# Patient Record
Sex: MALE | Race: WHITE | URBAN - METROPOLITAN AREA
[De-identification: names, ages, dates, MRNs, and addresses within clinical notes are randomized per-mention and may not be internally consistent; named-entity substitution may affect disease eponyms.]

---

## 2021-12-01 ENCOUNTER — OUT OF OFFICE VISIT (OUTPATIENT)
Dept: URBAN - METROPOLITAN AREA MEDICAL CENTER 33 | Facility: MEDICAL CENTER | Age: 71
End: 2021-12-01
Payer: COMMERCIAL

## 2021-12-01 DIAGNOSIS — D64.89 ANEMIA DUE TO OTHER CAUSE: ICD-10-CM

## 2021-12-01 DIAGNOSIS — Z79.02 ANTIPLATELET OR ANTITHROMBOTIC LONG-TERM USE: ICD-10-CM

## 2021-12-01 DIAGNOSIS — R13.12 DYSPHAGIA: ICD-10-CM

## 2021-12-01 PROCEDURE — 99222 1ST HOSP IP/OBS MODERATE 55: CPT | Performed by: INTERNAL MEDICINE

## 2021-12-01 PROCEDURE — G8427 DOCREV CUR MEDS BY ELIG CLIN: HCPCS | Performed by: INTERNAL MEDICINE

## 2021-12-02 ENCOUNTER — TELEPHONE ENCOUNTER (OUTPATIENT)
Dept: URBAN - METROPOLITAN AREA CLINIC 105 | Facility: CLINIC | Age: 71
End: 2021-12-02

## 2021-12-07 ENCOUNTER — OFFICE VISIT (OUTPATIENT)
Dept: URBAN - METROPOLITAN AREA MEDICAL CENTER 33 | Facility: MEDICAL CENTER | Age: 71
End: 2021-12-07
Payer: COMMERCIAL

## 2021-12-07 DIAGNOSIS — R63.39 FEEDING DIFFICULTY IN CHILD: ICD-10-CM

## 2021-12-07 DIAGNOSIS — R13.19 CERVICAL DYSPHAGIA: ICD-10-CM

## 2021-12-07 PROCEDURE — 43246 EGD PLACE GASTROSTOMY TUBE: CPT | Performed by: INTERNAL MEDICINE

## 2021-12-07 RX ORDER — LOSARTAN POTASSIUM 50 MG/1
TABLET, FILM COATED ORAL
Qty: 30 | Status: ACTIVE | COMMUNITY

## 2021-12-07 RX ORDER — ATORVASTATIN CALCIUM 80 MG/1
TABLET ORAL
Qty: 30 | Status: ACTIVE | COMMUNITY

## 2021-12-07 RX ORDER — LEVOTHYROXINE SODIUM 0.12 MG/1
TABLET ORAL
Qty: 30 | Status: ACTIVE | COMMUNITY

## 2021-12-07 RX ORDER — CLOPIDOGREL BISULFATE 75 MG/1
TABLET, FILM COATED ORAL
Qty: 30 | Status: ACTIVE | COMMUNITY

## 2021-12-07 NOTE — HPI-TODAY'S VISIT:
The patient was referred by Dr. Callahan, _____ .   A copy of this document is being forwarded to the referring provider.

## 2022-02-04 ENCOUNTER — OFFICE VISIT (OUTPATIENT)
Dept: URBAN - METROPOLITAN AREA CLINIC 105 | Facility: CLINIC | Age: 72
End: 2022-02-04
Payer: COMMERCIAL

## 2022-02-04 VITALS
DIASTOLIC BLOOD PRESSURE: 57 MMHG | WEIGHT: 153.8 LBS | SYSTOLIC BLOOD PRESSURE: 92 MMHG | HEIGHT: 72 IN | BODY MASS INDEX: 20.83 KG/M2 | HEART RATE: 50 BPM | TEMPERATURE: 96.7 F

## 2022-02-04 DIAGNOSIS — Z93.1 STATUS POST INSERTION OF PERCUTANEOUS ENDOSCOPIC GASTROSTOMY (PEG) TUBE: ICD-10-CM

## 2022-02-04 DIAGNOSIS — R13.12 OROPHARYNGEAL DYSPHAGIA: ICD-10-CM

## 2022-02-04 DIAGNOSIS — K94.23 LEAKING PEG TUBE: ICD-10-CM

## 2022-02-04 DIAGNOSIS — Z12.11 ENCOUNTER FOR SCREENING COLONOSCOPY: ICD-10-CM

## 2022-02-04 DIAGNOSIS — E44.0 MODERATE PROTEIN-CALORIE MALNUTRITION: ICD-10-CM

## 2022-02-04 PROCEDURE — 99204 OFFICE O/P NEW MOD 45 MIN: CPT | Performed by: INTERNAL MEDICINE

## 2022-02-04 RX ORDER — ATORVASTATIN CALCIUM 80 MG/1
TABLET ORAL
Qty: 30 | Status: ACTIVE | COMMUNITY

## 2022-02-04 RX ORDER — CLOPIDOGREL BISULFATE 75 MG/1
TABLET, FILM COATED ORAL
Qty: 30 | Status: ON HOLD | COMMUNITY

## 2022-02-04 RX ORDER — LEVOTHYROXINE SODIUM 0.12 MG/1
TABLET ORAL
Qty: 30 | Status: ACTIVE | COMMUNITY

## 2022-02-04 RX ORDER — LOSARTAN POTASSIUM 50 MG/1
TABLET, FILM COATED ORAL
Qty: 30 | Status: ACTIVE | COMMUNITY

## 2022-02-04 NOTE — PHYSICAL EXAM GASTROINTESTINAL
Abdomen soft, nontender, nondistended, no guarding or rigidity Liver and Spleen liver nontender; PEG insertion site looks great

## 2022-02-04 NOTE — HPI-TODAY'S VISIT:
The patient was referred by Dr. Callahan for a follow up for dysphagia  .   A copy of this document is being forwarded to the referring provider.  - oropharyngral dysphagia with silent aspiration; s/p PEG; now seen as follow up from hospital - overall doing well; wt stable, however has not gained wt - no n/v - eating through PEG; peg leaking very rarely, no bleeding, no pain, no erytherma or swelling - cologard for screening per patient's preference; no fam hx of colon cancer

## 2022-07-06 ENCOUNTER — TELEPHONE ENCOUNTER (OUTPATIENT)
Dept: URBAN - METROPOLITAN AREA CLINIC 105 | Facility: CLINIC | Age: 72
End: 2022-07-06

## 2022-10-19 ENCOUNTER — TELEPHONE ENCOUNTER (OUTPATIENT)
Dept: URBAN - METROPOLITAN AREA CLINIC 105 | Facility: CLINIC | Age: 72
End: 2022-10-19

## 2022-11-30 ENCOUNTER — OFFICE VISIT (OUTPATIENT)
Dept: URBAN - METROPOLITAN AREA CLINIC 105 | Facility: CLINIC | Age: 72
End: 2022-11-30
Payer: COMMERCIAL

## 2022-11-30 ENCOUNTER — LAB OUTSIDE AN ENCOUNTER (OUTPATIENT)
Dept: URBAN - METROPOLITAN AREA CLINIC 105 | Facility: CLINIC | Age: 72
End: 2022-11-30

## 2022-11-30 ENCOUNTER — WEB ENCOUNTER (OUTPATIENT)
Dept: URBAN - METROPOLITAN AREA CLINIC 105 | Facility: CLINIC | Age: 72
End: 2022-11-30

## 2022-11-30 VITALS
BODY MASS INDEX: 20.18 KG/M2 | HEIGHT: 72 IN | TEMPERATURE: 97.2 F | HEART RATE: 73 BPM | DIASTOLIC BLOOD PRESSURE: 68 MMHG | WEIGHT: 149 LBS | SYSTOLIC BLOOD PRESSURE: 100 MMHG

## 2022-11-30 DIAGNOSIS — K94.23 PEG TUBE MALFUNCTION: ICD-10-CM

## 2022-11-30 DIAGNOSIS — Z93.1 STATUS POST INSERTION OF PERCUTANEOUS ENDOSCOPIC GASTROSTOMY (PEG) TUBE: ICD-10-CM

## 2022-11-30 DIAGNOSIS — R13.12 OROPHARYNGEAL DYSPHAGIA: ICD-10-CM

## 2022-11-30 DIAGNOSIS — E44.0 MODERATE PROTEIN-CALORIE MALNUTRITION: ICD-10-CM

## 2022-11-30 PROBLEM — 426032000: Status: ACTIVE | Noted: 2022-11-30

## 2022-11-30 PROBLEM — 161689000: Status: ACTIVE | Noted: 2022-02-04

## 2022-11-30 PROBLEM — 190606006: Status: ACTIVE | Noted: 2022-02-04

## 2022-11-30 PROBLEM — 71457002: Status: ACTIVE | Noted: 2022-02-04

## 2022-11-30 PROCEDURE — 99214 OFFICE O/P EST MOD 30 MIN: CPT | Performed by: INTERNAL MEDICINE

## 2022-11-30 RX ORDER — LEVOTHYROXINE SODIUM 0.12 MG/1
TABLET ORAL
Qty: 30 | Status: ACTIVE | COMMUNITY

## 2022-11-30 RX ORDER — ATORVASTATIN CALCIUM 80 MG/1
TABLET ORAL
Qty: 30 | Status: ACTIVE | COMMUNITY

## 2022-11-30 RX ORDER — LOSARTAN POTASSIUM 50 MG/1
TABLET, FILM COATED ORAL
Qty: 30 | Status: ACTIVE | COMMUNITY

## 2022-11-30 RX ORDER — CLOPIDOGREL BISULFATE 75 MG/1
TABLET, FILM COATED ORAL
Qty: 30 | Status: ACTIVE | COMMUNITY

## 2022-11-30 NOTE — HPI-TODAY'S VISIT:
- doing well - no improvement in swallowing function - peg doing fine, wants the stephani low prophile - no n/v - was wondering, he developed some phlegm, however is not post prandial, therefore unlikey to be regurgitation

## 2022-12-07 ENCOUNTER — OFFICE VISIT (OUTPATIENT)
Dept: URBAN - METROPOLITAN AREA CLINIC 105 | Facility: CLINIC | Age: 72
End: 2022-12-07

## 2022-12-08 ENCOUNTER — OFFICE VISIT (OUTPATIENT)
Dept: URBAN - METROPOLITAN AREA TELEHEALTH 2 | Facility: TELEHEALTH | Age: 72
End: 2022-12-08

## 2023-02-21 ENCOUNTER — TELEPHONE ENCOUNTER (OUTPATIENT)
Dept: URBAN - METROPOLITAN AREA CLINIC 105 | Facility: CLINIC | Age: 73
End: 2023-02-21

## 2023-05-10 ENCOUNTER — OFFICE VISIT (OUTPATIENT)
Dept: URBAN - METROPOLITAN AREA CLINIC 105 | Facility: CLINIC | Age: 73
End: 2023-05-10
Payer: COMMERCIAL

## 2023-05-10 ENCOUNTER — LAB OUTSIDE AN ENCOUNTER (OUTPATIENT)
Dept: URBAN - METROPOLITAN AREA CLINIC 105 | Facility: CLINIC | Age: 73
End: 2023-05-10

## 2023-05-10 VITALS
SYSTOLIC BLOOD PRESSURE: 119 MMHG | WEIGHT: 150.8 LBS | DIASTOLIC BLOOD PRESSURE: 73 MMHG | TEMPERATURE: 97.2 F | HEIGHT: 72 IN | HEART RATE: 65 BPM | BODY MASS INDEX: 20.42 KG/M2

## 2023-05-10 DIAGNOSIS — R13.12 OROPHARYNGEAL DYSPHAGIA: ICD-10-CM

## 2023-05-10 DIAGNOSIS — K94.23 PEG TUBE MALFUNCTION: ICD-10-CM

## 2023-05-10 DIAGNOSIS — Z92.3 HISTORY OF RADIATION TO HEAD AND NECK REGION: ICD-10-CM

## 2023-05-10 PROBLEM — 161633009: Status: ACTIVE | Noted: 2023-05-10

## 2023-05-10 PROCEDURE — 99214 OFFICE O/P EST MOD 30 MIN: CPT | Performed by: INTERNAL MEDICINE

## 2023-05-10 RX ORDER — LOSARTAN POTASSIUM 50 MG/1
TABLET, FILM COATED ORAL
Qty: 30 | Status: ACTIVE | COMMUNITY

## 2023-05-10 RX ORDER — ATORVASTATIN CALCIUM 80 MG/1
TABLET ORAL
Qty: 30 | Status: ON HOLD | COMMUNITY

## 2023-05-10 RX ORDER — LEVOTHYROXINE SODIUM 0.12 MG/1
TABLET ORAL
Qty: 30 | Status: ACTIVE | COMMUNITY

## 2023-05-10 RX ORDER — CLOPIDOGREL BISULFATE 75 MG/1
TABLET, FILM COATED ORAL
Qty: 30 | Status: ON HOLD | COMMUNITY

## 2023-05-10 NOTE — HPI-TODAY'S VISIT:
cc: dysphagia - following up; same with dysphagia; dependent from the feeding tube - doing cologuard fro screening colon, says negative last year - keeps having problems with the peg tube here and there; was leaking from the tube shaft itself

## 2023-05-10 NOTE — PHYSICAL EXAM GASTROINTESTINAL
Abdomen soft, nontender, nondistended, no guarding or rigidity Liver and Spleen liver nontender. PEG looks good.

## 2023-06-06 ENCOUNTER — TELEPHONE ENCOUNTER (OUTPATIENT)
Dept: URBAN - METROPOLITAN AREA CLINIC 105 | Facility: CLINIC | Age: 73
End: 2023-06-06

## 2023-06-08 ENCOUNTER — OFFICE VISIT (OUTPATIENT)
Dept: URBAN - METROPOLITAN AREA MEDICAL CENTER 33 | Facility: MEDICAL CENTER | Age: 73
End: 2023-06-08

## 2023-08-08 ENCOUNTER — TELEPHONE ENCOUNTER (OUTPATIENT)
Dept: URBAN - METROPOLITAN AREA CLINIC 105 | Facility: CLINIC | Age: 73
End: 2023-08-08

## 2023-09-01 ENCOUNTER — OFFICE VISIT (OUTPATIENT)
Dept: URBAN - METROPOLITAN AREA MEDICAL CENTER 33 | Facility: MEDICAL CENTER | Age: 73
End: 2023-09-01
Payer: COMMERCIAL

## 2023-09-01 DIAGNOSIS — K94.23 COMPLICATION OF FEEDING TUBE: ICD-10-CM

## 2023-09-01 PROCEDURE — 43762 RPLC GTUBE NO REVJ TRC: CPT | Performed by: INTERNAL MEDICINE

## 2023-09-01 RX ORDER — ATORVASTATIN CALCIUM 80 MG/1
TABLET ORAL
Qty: 30 | COMMUNITY

## 2023-09-01 RX ORDER — CLOPIDOGREL BISULFATE 75 MG/1
TABLET, FILM COATED ORAL
Qty: 30 | COMMUNITY

## 2023-09-01 RX ORDER — LOSARTAN POTASSIUM 50 MG/1
TABLET, FILM COATED ORAL
Qty: 30 | Status: ACTIVE | COMMUNITY

## 2023-09-01 RX ORDER — LEVOTHYROXINE SODIUM 0.12 MG/1
TABLET ORAL
Qty: 30 | Status: ACTIVE | COMMUNITY

## 2023-11-08 ENCOUNTER — OFFICE VISIT (OUTPATIENT)
Dept: URBAN - METROPOLITAN AREA CLINIC 105 | Facility: CLINIC | Age: 73
End: 2023-11-08
Payer: COMMERCIAL

## 2023-11-08 VITALS
DIASTOLIC BLOOD PRESSURE: 118 MMHG | HEIGHT: 72 IN | WEIGHT: 146 LBS | TEMPERATURE: 97.1 F | SYSTOLIC BLOOD PRESSURE: 197 MMHG | HEART RATE: 73 BPM | BODY MASS INDEX: 19.77 KG/M2

## 2023-11-08 DIAGNOSIS — R13.12 OROPHARYNGEAL DYSPHAGIA: ICD-10-CM

## 2023-11-08 DIAGNOSIS — Z93.1 STATUS POST INSERTION OF PERCUTANEOUS ENDOSCOPIC GASTROSTOMY (PEG) TUBE: ICD-10-CM

## 2023-11-08 PROCEDURE — 99212 OFFICE O/P EST SF 10 MIN: CPT | Performed by: INTERNAL MEDICINE

## 2023-11-08 RX ORDER — LOSARTAN POTASSIUM 50 MG/1
TABLET, FILM COATED ORAL
Qty: 30 | Status: ACTIVE | COMMUNITY

## 2023-11-08 RX ORDER — LEVOTHYROXINE SODIUM 0.12 MG/1
TABLET ORAL
Qty: 30 | Status: ACTIVE | COMMUNITY

## 2023-11-08 RX ORDER — CLOPIDOGREL BISULFATE 75 MG/1
TABLET, FILM COATED ORAL
Qty: 30 | COMMUNITY

## 2023-11-08 RX ORDER — ATORVASTATIN CALCIUM 80 MG/1
TABLET ORAL
Qty: 30 | COMMUNITY

## 2023-11-08 NOTE — HPI-TODAY'S VISIT:
- following up - no abd pain - feeding tube is working great - the new peg stephani low profile, no complaints with it

## 2024-01-22 ENCOUNTER — TELEPHONE ENCOUNTER (OUTPATIENT)
Dept: URBAN - METROPOLITAN AREA CLINIC 105 | Facility: CLINIC | Age: 74
End: 2024-01-22

## 2024-02-26 ENCOUNTER — OV EP (OUTPATIENT)
Dept: URBAN - METROPOLITAN AREA CLINIC 105 | Facility: CLINIC | Age: 74
End: 2024-02-26

## 2024-02-26 RX ORDER — CLOPIDOGREL BISULFATE 75 MG/1
TABLET, FILM COATED ORAL
Qty: 30 | COMMUNITY

## 2024-02-26 RX ORDER — LEVOTHYROXINE SODIUM 0.12 MG/1
TABLET ORAL
Qty: 30 | Status: ACTIVE | COMMUNITY

## 2024-02-26 RX ORDER — LOSARTAN POTASSIUM 50 MG/1
TABLET, FILM COATED ORAL
Qty: 30 | Status: ACTIVE | COMMUNITY

## 2024-02-26 RX ORDER — ATORVASTATIN CALCIUM 80 MG/1
TABLET ORAL
Qty: 30 | COMMUNITY

## 2024-02-28 ENCOUNTER — OV EP (OUTPATIENT)
Dept: URBAN - METROPOLITAN AREA CLINIC 105 | Facility: CLINIC | Age: 74
End: 2024-02-28

## 2024-02-28 ENCOUNTER — LAB (OUTPATIENT)
Dept: URBAN - METROPOLITAN AREA CLINIC 105 | Facility: CLINIC | Age: 74
End: 2024-02-28

## 2024-02-28 VITALS
HEIGHT: 72 IN | TEMPERATURE: 97.3 F | HEART RATE: 64 BPM | BODY MASS INDEX: 20.05 KG/M2 | SYSTOLIC BLOOD PRESSURE: 129 MMHG | WEIGHT: 148 LBS | DIASTOLIC BLOOD PRESSURE: 81 MMHG

## 2024-02-28 RX ORDER — LEVOTHYROXINE SODIUM 0.12 MG/1
TABLET ORAL
Qty: 30 | Status: ACTIVE | COMMUNITY

## 2024-02-28 RX ORDER — ATORVASTATIN CALCIUM 80 MG/1
TABLET ORAL
Qty: 30 | COMMUNITY

## 2024-02-28 RX ORDER — CLOPIDOGREL BISULFATE 75 MG/1
TABLET, FILM COATED ORAL
Qty: 30 | COMMUNITY

## 2024-02-28 RX ORDER — LOSARTAN POTASSIUM 50 MG/1
TABLET, FILM COATED ORAL
Qty: 30 | Status: ACTIVE | COMMUNITY

## 2024-02-28 NOTE — HPI-TODAY'S VISIT:
The patient presents for PEG tube change.  Today, the patient says Dr. Marcus placed a new tube in Sept. He thinks it has gotten a little loose and there is some leakage. He is doing tube feedings, nothing by mouth. 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 He was doing okay for many years following radiation, but was hospitalized in Dec 2021 due to aspirating. He has never seen a speech therapist, only told to do swallowing excercises. He's never had home health visit his home. He previously weight 170 lbs (3 years ago), but has dropped to 135. He has gained 5 lbs over the last couple of weeks. He is trying to eat a lot of protein (ground up food) and has a  come to his house. Ideally, he would like to get back up to 160-165 lbs. Last cologuard was 3 years ago - negative.  Labs 12/1/21 - CBC normal except hgb 11.6. BMP normal except sodium 130.  11/29/21 - CMP normal except sodium 132. INR normal. 6/30/20 - CBC normal except hgb 12.9.

## 2024-03-15 ENCOUNTER — PEG (OUTPATIENT)
Dept: URBAN - METROPOLITAN AREA MEDICAL CENTER 33 | Facility: MEDICAL CENTER | Age: 74
End: 2024-03-15
Payer: COMMERCIAL

## 2024-03-15 DIAGNOSIS — R63.30 FEEDING DIFFICULTIES: ICD-10-CM

## 2024-03-15 PROCEDURE — 43762 RPLC GTUBE NO REVJ TRC: CPT | Performed by: INTERNAL MEDICINE

## 2024-03-15 RX ORDER — ATORVASTATIN CALCIUM 80 MG/1
TABLET ORAL
Qty: 30 | COMMUNITY

## 2024-03-15 RX ORDER — CLOPIDOGREL BISULFATE 75 MG/1
TABLET, FILM COATED ORAL
Qty: 30 | COMMUNITY

## 2024-03-15 RX ORDER — LEVOTHYROXINE SODIUM 0.12 MG/1
TABLET ORAL
Qty: 30 | Status: ACTIVE | COMMUNITY

## 2024-03-15 RX ORDER — LOSARTAN POTASSIUM 50 MG/1
TABLET, FILM COATED ORAL
Qty: 30 | Status: ACTIVE | COMMUNITY

## 2024-09-17 ENCOUNTER — OFFICE VISIT (OUTPATIENT)
Dept: URBAN - METROPOLITAN AREA CLINIC 105 | Facility: CLINIC | Age: 74
End: 2024-09-17
Payer: COMMERCIAL

## 2024-09-17 ENCOUNTER — DASHBOARD ENCOUNTERS (OUTPATIENT)
Age: 74
End: 2024-09-17

## 2024-09-17 VITALS
WEIGHT: 138.6 LBS | TEMPERATURE: 97.5 F | SYSTOLIC BLOOD PRESSURE: 152 MMHG | BODY MASS INDEX: 18.77 KG/M2 | HEIGHT: 72 IN | HEART RATE: 66 BPM | DIASTOLIC BLOOD PRESSURE: 89 MMHG

## 2024-09-17 DIAGNOSIS — R63.4 WEIGHT LOSS, UNINTENTIONAL: ICD-10-CM

## 2024-09-17 DIAGNOSIS — Z92.3 HISTORY OF RADIATION TO HEAD AND NECK REGION: ICD-10-CM

## 2024-09-17 DIAGNOSIS — R13.12 OROPHARYNGEAL DYSPHAGIA: ICD-10-CM

## 2024-09-17 DIAGNOSIS — Z93.1 STATUS POST INSERTION OF PERCUTANEOUS ENDOSCOPIC GASTROSTOMY (PEG) TUBE: ICD-10-CM

## 2024-09-17 DIAGNOSIS — Z12.11 SCREEN FOR COLON CANCER: ICD-10-CM

## 2024-09-17 PROCEDURE — 99213 OFFICE O/P EST LOW 20 MIN: CPT | Performed by: INTERNAL MEDICINE

## 2024-09-17 RX ORDER — LEVOTHYROXINE SODIUM 0.12 MG/1
TABLET ORAL
Qty: 30 | Status: ACTIVE | COMMUNITY

## 2024-09-17 RX ORDER — LOSARTAN POTASSIUM 50 MG/1
TABLET, FILM COATED ORAL
Qty: 30 | Status: ACTIVE | COMMUNITY

## 2024-09-17 NOTE — HPI-TODAY'S VISIT:
The patient presents in follow-up for PEG tube change.  On 2/28/24, the patient said Dr. Marcus placed a new tube in Sept. He thought it had gotten a little loose and there was some leakage. He was doing tube feedings, nothing by mouth. 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 He was doing okay for many years following radiation, but was hospitalized in Dec 2021 due to aspirating. He had never seen a speech therapist, only told to do swallowing excercises. He had never had home health visit his home. He previously [id number] lbs (3 years ago), but had dropped to 135. He had gained 5 lbs over the last couple of weeks. He was trying to eat a lot of protein (ground up food) and had a  come to his house. Ideally, he would like to get back up to 160-165 lbs. Last cologuard was 3 years ago - negative.   HPI Today, he says he did not follow-up with the speech therapist because he got accepted into a 6-7 week transcendental meditation program. He plans to resume speech therapy follow-up visits. His tube feeding is going well - says he's eating a lot of proteins and calories - but he is loosing weight. He reports some mental impairment if he administers above a certain amount itube feeding n one meal. This feeling of impairment dissipates by the evening time. He saw the nutritionist that we referred him to who suggested he start on the [employer]. He found the program to be too cumbersome. Later this week he will see an " nutritionist."  He is not using any nutritional supplements.  Labs 12/1/21 - CBC normal except hgb 11.6. BMP normal except sodium 130.  11/29/21 - CMP normal except sodium 132. INR normal. 6/30/20 - CBC normal except hgb 12.9.

## 2024-10-09 ENCOUNTER — OFFICE VISIT (OUTPATIENT)
Dept: URBAN - METROPOLITAN AREA TELEHEALTH 2 | Facility: TELEHEALTH | Age: 74
End: 2024-10-09

## 2024-12-26 ENCOUNTER — WEB ENCOUNTER (OUTPATIENT)
Dept: URBAN - METROPOLITAN AREA CLINIC 105 | Facility: CLINIC | Age: 74
End: 2024-12-26

## 2025-01-08 ENCOUNTER — OFFICE VISIT (OUTPATIENT)
Dept: URBAN - METROPOLITAN AREA CLINIC 105 | Facility: CLINIC | Age: 75
End: 2025-01-08

## 2025-01-15 ENCOUNTER — OFFICE VISIT (OUTPATIENT)
Dept: URBAN - METROPOLITAN AREA CLINIC 105 | Facility: CLINIC | Age: 75
End: 2025-01-15
Payer: COMMERCIAL

## 2025-01-15 VITALS
BODY MASS INDEX: 20.62 KG/M2 | HEIGHT: 72 IN | WEIGHT: 152.2 LBS | SYSTOLIC BLOOD PRESSURE: 188 MMHG | DIASTOLIC BLOOD PRESSURE: 89 MMHG

## 2025-01-15 DIAGNOSIS — R63.4 WEIGHT LOSS, UNINTENTIONAL: ICD-10-CM

## 2025-01-15 DIAGNOSIS — Z93.1 STATUS POST INSERTION OF PERCUTANEOUS ENDOSCOPIC GASTROSTOMY (PEG) TUBE: ICD-10-CM

## 2025-01-15 DIAGNOSIS — R13.12 OROPHARYNGEAL DYSPHAGIA: ICD-10-CM

## 2025-01-15 DIAGNOSIS — Z12.11 SCREEN FOR COLON CANCER: ICD-10-CM

## 2025-01-15 DIAGNOSIS — Z92.3 HISTORY OF RADIATION TO HEAD AND NECK REGION: ICD-10-CM

## 2025-01-15 PROCEDURE — 43762 RPLC GTUBE NO REVJ TRC: CPT | Performed by: INTERNAL MEDICINE

## 2025-01-15 PROCEDURE — 99213 OFFICE O/P EST LOW 20 MIN: CPT | Performed by: INTERNAL MEDICINE

## 2025-01-15 RX ORDER — LEVOTHYROXINE SODIUM 0.12 MG/1
TABLET ORAL
Qty: 30 | Status: ACTIVE | COMMUNITY

## 2025-01-15 RX ORDER — ROSUVASTATIN CALCIUM 10 MG/1
1 TABLET TABLET, COATED ORAL ONCE A DAY
Status: ACTIVE | COMMUNITY

## 2025-01-15 RX ORDER — LOSARTAN POTASSIUM 50 MG/1
TABLET, FILM COATED ORAL
Qty: 30 | Status: ACTIVE | COMMUNITY

## 2025-01-15 NOTE — HPI-TODAY'S VISIT:
The patient presents in follow-up for PEG tube change.  On 2/28/24, the patient said Dr. Marcus placed a new tube in Sept. He thought it had gotten a little loose and there was some leakage. He was doing tube feedings, nothing by mouth. 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 He was doing okay for many years following radiation, but was hospitalized in Dec 2021 due to aspirating. He had never seen a speech therapist, only told to do swallowing excercises. He had never had home health visit his home. He previously [id number] lbs (3 years ago), but had dropped to 135. He had gained 5 lbs over the last couple of weeks. He was trying to eat a lot of protein (ground up food) and had a  come to his house. Ideally, he would like to get back up to 160-165 lbs. Last cologuard was 3 years ago - negative.   On 9/17/24, he said he did not follow-up with the speech therapist because he got accepted into a 6-7 week transcendental meditation program. He planned to resume speech therapy follow-up visits. His tube feeding was going well - said he was eating a lot of proteins and calories - but he was losing weight. He reported some mental impairment if he administered above a certain amount tube feeding in one meal. This feeling of impairment dissipated by the evening time. He saw the nutritionist that we referred him to who suggested he start on the [employer]. He found the program to be too cumbersome. Later this week he would see an " nutritionist."  He was not using any nutritional supplements.  HPI Today, the patient presents for a PEG tube change - part of the front piece of tube broke off. He is not eating anything by mouth - tried but had difficulty and aspirated.  His caloric intake via the tube is increasing and he is gaining weight.  Labs 12/1/21 - CBC normal except hgb 11.6. BMP normal except sodium 130.  11/29/21 - CMP normal except sodium 132. INR normal. 6/30/20 - CBC normal except hgb 12.9. Detail Level: Simple Additional Notes: Patient consent was obtained to proceed with the visit and recommended plan of care after discussion of all risks and benefits, including the risks of COVID-19 exposure.

## 2025-01-19 ENCOUNTER — LAB OUTSIDE AN ENCOUNTER (OUTPATIENT)
Dept: URBAN - METROPOLITAN AREA CLINIC 105 | Facility: CLINIC | Age: 75
End: 2025-01-19

## 2025-07-01 ENCOUNTER — OFFICE VISIT (OUTPATIENT)
Dept: URBAN - METROPOLITAN AREA CLINIC 105 | Facility: CLINIC | Age: 75
End: 2025-07-01
Payer: COMMERCIAL

## 2025-07-01 DIAGNOSIS — R63.4 WEIGHT LOSS, UNINTENTIONAL: ICD-10-CM

## 2025-07-01 DIAGNOSIS — R13.12 OROPHARYNGEAL DYSPHAGIA: ICD-10-CM

## 2025-07-01 DIAGNOSIS — Z93.1 STATUS POST INSERTION OF PERCUTANEOUS ENDOSCOPIC GASTROSTOMY (PEG) TUBE: ICD-10-CM

## 2025-07-01 DIAGNOSIS — Z12.11 SCREEN FOR COLON CANCER: ICD-10-CM

## 2025-07-01 DIAGNOSIS — Z92.3 HISTORY OF RADIATION TO HEAD AND NECK REGION: ICD-10-CM

## 2025-07-01 PROCEDURE — 99213 OFFICE O/P EST LOW 20 MIN: CPT | Performed by: INTERNAL MEDICINE

## 2025-07-01 RX ORDER — LOSARTAN POTASSIUM 50 MG/1
TABLET, FILM COATED ORAL
Qty: 30 | Status: ACTIVE | COMMUNITY

## 2025-07-01 RX ORDER — LEVOTHYROXINE SODIUM 0.12 MG/1
TABLET ORAL
Qty: 30 | Status: ACTIVE | COMMUNITY

## 2025-07-01 NOTE — HPI-TODAY'S VISIT:
The patient presents in follow-up for PEG tube change.  On 2/28/24, the patient said Dr. Marcus placed a new tube in Sept. He thought it had gotten a little loose and there was some leakage. He was doing tube feedings, nothing by mouth. 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 He was doing okay for many years following radiation, but was hospitalized in Dec 2021 due to aspirating. He had never seen a speech therapist, only told to do swallowing excercises. He had never had home health visit his home. He previously [id number] lbs (3 years ago), but had dropped to 135. He had gained 5 lbs over the last couple of weeks. He was trying to eat a lot of protein (ground up food) and had a  come to his house. Ideally, he would like to get back up to 160-165 lbs. Last cologuard was 3 years ago - negative.   On 9/17/24, he said he did not follow-up with the speech therapist because he got accepted into a 6-7 week transcendental meditation program. He planned to resume speech therapy follow-up visits. His tube feeding was going well - said he was eating a lot of proteins and calories - but he was losing weight. He reported some mental impairment if he administered above a certain amount tube feeding in one meal. This feeling of impairment dissipated by the evening time. He saw the nutritionist that we referred him to who suggested he start on the [employer]. He found the program to be too cumbersome. Later this week he would see an " nutritionist."  He was not using any nutritional supplements.  On 1/15/25, the patient presented for a PEG tube change - part of the front piece of tube broke off. He was not eating anything by mouth - tried but had difficulty and aspirated.  His caloric intake via the tube is increasing and he was gaining weight.  HPI Today, he says all his nutrition is going through his tube, nothing by mouth. He has gained weight - now in low 150s.  Labs 12/1/21 - CBC normal except hgb 11.6. BMP normal except sodium 130.  11/29/21 - CMP normal except sodium 132. INR normal. 6/30/20 - CBC normal except hgb 12.9.